# Patient Record
Sex: MALE | Race: WHITE | Employment: FULL TIME | URBAN - METROPOLITAN AREA
[De-identification: names, ages, dates, MRNs, and addresses within clinical notes are randomized per-mention and may not be internally consistent; named-entity substitution may affect disease eponyms.]

---

## 2018-03-22 PROBLEM — F90.2 ATTENTION DEFICIT HYPERACTIVITY DISORDER (ADHD), COMBINED TYPE: Status: ACTIVE | Noted: 2018-03-22

## 2019-06-19 NOTE — PROGRESS NOTES
Juan Antonio East is a 30year old male.   Patient presents with:  Establish Care: refill       HPI:   Pt comes as a new pt -- used ot see dr Miriam Thakur here next door   C/c adhd   C/o noted bp is high and states he had a rough day , needs refills       Work (ADHD), combined type  -     Lisdexamfetamine Dimesylate (VYVANSE) 50 MG Oral Cap; Take 1 capsule (50 mg total) by mouth every morning.     labs from March 2018 reviewed  Will give panel for meds at next visit   He is acquired and he might get a different i

## 2019-06-19 NOTE — PATIENT INSTRUCTIONS
Attention-Deficit/Hyperactivity Disorder (ADHD) in Adults  You’ve always had trouble concentrating. Your mind wanders, and it’s hard to finish tasks. As a result, you didn’t do well in school. And now, you often struggle with your job.  Sometimes this pippa The first step is finding out if you really have ADHD. Your doctor will use special guidelines to diagnose the disorder. Most adults with ADHD are greatly helped by therapy and coaching.  In some cases, your doctor may also prescribe medicine to ease your s

## 2019-07-22 ENCOUNTER — OFFICE VISIT (OUTPATIENT)
Dept: INTERNAL MEDICINE CLINIC | Facility: CLINIC | Age: 29
End: 2019-07-22
Payer: COMMERCIAL

## 2019-07-22 VITALS
HEIGHT: 75 IN | DIASTOLIC BLOOD PRESSURE: 91 MMHG | SYSTOLIC BLOOD PRESSURE: 142 MMHG | HEART RATE: 74 BPM | WEIGHT: 254.19 LBS | BODY MASS INDEX: 31.61 KG/M2

## 2019-07-22 DIAGNOSIS — F90.2 ATTENTION DEFICIT HYPERACTIVITY DISORDER (ADHD), COMBINED TYPE: ICD-10-CM

## 2019-07-22 DIAGNOSIS — I10 ESSENTIAL HYPERTENSION: Primary | ICD-10-CM

## 2019-07-22 PROCEDURE — 99214 OFFICE O/P EST MOD 30 MIN: CPT | Performed by: INTERNAL MEDICINE

## 2019-07-22 NOTE — PROGRESS NOTES
Mary Kay Carrillo is a 30year old male.   Patient presents with:  ADHD      HPI:   Pt comes for f/u  C/c adhd   C/o noted bp is high   Started intermittent fasting and mor eksantos with more protein and fats   Stress at work- might lose his job   Doesn't think it shortness of breath, cough, wheezing  CARDIOVASCULAR: denies chest pain on exertion, palpitations, swelling in feet  GI: denies abdominal pain and denies heartburn, nausea or vomiting  NEURO: denies headaches , anxiety, depression-- denies this but just st

## 2019-11-08 ENCOUNTER — MED REC SCAN ONLY (OUTPATIENT)
Dept: INTERNAL MEDICINE CLINIC | Facility: CLINIC | Age: 29
End: 2019-11-08

## 2019-11-08 NOTE — PROGRESS NOTES
Patient ID: Olena Odell is a 30year old male. Patient presents with:  Medication Request: Vyvanse        HISTORY OF PRESENT ILLNESS:   HPI  Patient presents for above. Here for refill on his Vyvanse. He is on a stable dose.   Patient states he will b Highest education level: Not on file    Occupational History      Not on file    Social Needs      Financial resource strain: Not on file      Food insecurity:        Worry: Not on file        Inability: Not on file      Transportation needs:        Lanette Croft disorder (ADHD), combined type  · Lisdexamfetamine Dimesylate (VYVANSE) 50 MG Oral Cap; Take 1 capsule (50 mg total) by mouth daily. Dispense: 30 capsule; Refill: 0  · Lisdexamfetamine Dimesylate (VYVANSE) 50 MG Oral Cap;  Take 1 capsule (50 mg total) by m

## 2019-11-13 ENCOUNTER — PATIENT MESSAGE (OUTPATIENT)
Dept: INTERNAL MEDICINE CLINIC | Facility: CLINIC | Age: 29
End: 2019-11-13

## 2019-11-13 NOTE — TELEPHONE ENCOUNTER
From: Flip Veras  To: Aida Rodriguez MD  Sent: 11/13/2019 12:05 PM CST  Subject: Prescription Question    Hello,  I was wondering if it would be possible to start getting a prescription for an ADHD medication with a generic option.  I just found out I am

## 2019-11-21 NOTE — TELEPHONE ENCOUNTER
Called pt and spoke to pt --his insurance is going to change and Vyvanse will be too expensive so wondering if he can go back to Adderall which was he was on a long time ago  He was changed from Adderall to Vyvanse to see if it would work better  Will forw

## 2019-11-29 RX ORDER — DEXTROAMPHETAMINE SACCHARATE, AMPHETAMINE ASPARTATE, DEXTROAMPHETAMINE SULFATE AND AMPHETAMINE SULFATE 1.25; 1.25; 1.25; 1.25 MG/1; MG/1; MG/1; MG/1
5 TABLET ORAL DAILY
Qty: 90 TABLET | Refills: 0 | Status: SHIPPED | OUTPATIENT
Start: 2019-11-29 | End: 2020-01-13

## 2019-11-30 NOTE — TELEPHONE ENCOUNTER
Please let patient know that Adderall has been sent to his pharmacy on file–low-dose and can increase as needed. He can start this after he finishes the Vyvanse. Please ask him not to take both at the same time.

## 2020-01-14 RX ORDER — DEXTROAMPHETAMINE SACCHARATE, AMPHETAMINE ASPARTATE, DEXTROAMPHETAMINE SULFATE AND AMPHETAMINE SULFATE 1.25; 1.25; 1.25; 1.25 MG/1; MG/1; MG/1; MG/1
5 TABLET ORAL DAILY
Qty: 30 TABLET | Refills: 0 | Status: SHIPPED | OUTPATIENT
Start: 2020-02-13 | End: 2020-03-04

## 2020-01-14 RX ORDER — DEXTROAMPHETAMINE SACCHARATE, AMPHETAMINE ASPARTATE, DEXTROAMPHETAMINE SULFATE AND AMPHETAMINE SULFATE 1.25; 1.25; 1.25; 1.25 MG/1; MG/1; MG/1; MG/1
5 TABLET ORAL DAILY
Qty: 30 TABLET | Refills: 0 | Status: SHIPPED | OUTPATIENT
Start: 2020-03-13 | End: 2020-03-04

## 2020-01-14 RX ORDER — DEXTROAMPHETAMINE SACCHARATE, AMPHETAMINE ASPARTATE, DEXTROAMPHETAMINE SULFATE AND AMPHETAMINE SULFATE 1.25; 1.25; 1.25; 1.25 MG/1; MG/1; MG/1; MG/1
5 TABLET ORAL DAILY
Qty: 30 TABLET | Refills: 0 | Status: SHIPPED | OUTPATIENT
Start: 2020-01-14 | End: 2020-03-04

## 2020-01-14 NOTE — TELEPHONE ENCOUNTER
Controlled medication pending for review. Please change to phone in, fax, or print script if not being sent electronically.     Last Rx: 11-29-19 # 90  LOV: 7-22-19      Requested Prescriptions     Pending Prescriptions Disp Refills   • amphetamine-dex

## 2020-03-04 NOTE — PROGRESS NOTES
Rome Khoury is a 30year old male.   Patient presents with:  HTN      HPI:   Pt comes for f/u  C/c htn  C/o htn --stopped the thermogenic pills  But bp still high even at home   Never on meds   Lost his insurace and had to chane from [facility] to adderal -- cough, wheezing  CARDIOVASCULAR: denies chest pain on exertion, palpitations, swelling in feet  NEURO: denies headaches , ?  Anxiety,no  Depression-- much better now , was worse during the holidays like nov and dec when he broke wup with his GF who was foun issues and agrees to the plan. No follow-ups on file.

## 2020-03-04 NOTE — PATIENT INSTRUCTIONS
Eating for your heart doesn’t have to be hard or boring. You just need to know how to make healthier choices. The DASH eating plan has been developed to help you do just that. DASH stands for Dietary Approaches to Stop Hypertension.  It is a plan that has b visible fat. Broil, grill, roast, or boil instead of frying. Remove skin from poultry before eating.  Limit how much red meat you eat.  Nuts, seeds, beans  Servings: 4 to 5 a week  A serving is:  · One-third cup nuts (one and a half ounces)  · 2 tablespoons salt  Stay away from:  · Sausage, reyes, and ham  · Flour tortillas  · Packaged muffins, pancakes, and biscuits  · Instant hot cereals  · Cottage cheese  For lunch and dinner  · Fresh fish, chicken, turkey, or meat—baked, broiled, or roasted without salt

## 2020-05-11 RX ORDER — DEXTROAMPHETAMINE SACCHARATE, AMPHETAMINE ASPARTATE, DEXTROAMPHETAMINE SULFATE AND AMPHETAMINE SULFATE 5; 5; 5; 5 MG/1; MG/1; MG/1; MG/1
20 TABLET ORAL DAILY
Qty: 30 TABLET | Refills: 0 | Status: SHIPPED | OUTPATIENT
Start: 2020-05-11 | End: 2020-06-15

## 2020-06-09 ENCOUNTER — NURSE TRIAGE (OUTPATIENT)
Dept: INTERNAL MEDICINE CLINIC | Facility: CLINIC | Age: 30
End: 2020-06-09

## 2020-06-09 NOTE — TELEPHONE ENCOUNTER
Action Requested: Summary for Provider     []  Critical Lab, Recommendations Needed  [] Need Additional Advice  [x]   FYI    []   Need Orders  [] Need Medications Sent to Pharmacy  []  Other     SUMMARY:     Spoke with pt,  verified, pt c/o rash on bila

## 2020-06-09 NOTE — TELEPHONE ENCOUNTER
Sent a [employer] message for the patient to call us.    ----- Message from Davin Ferreira sent at 6/9/2020 10:57 AM CDT -----  Regarding: Prescription Question  Contact: [phone]  Hello,  For the last 8+ months I've been very itchy and had red bumps/pimpl

## 2020-06-10 ENCOUNTER — VIRTUAL PHONE E/M (OUTPATIENT)
Dept: INTERNAL MEDICINE CLINIC | Facility: CLINIC | Age: 30
End: 2020-06-10

## 2020-06-10 DIAGNOSIS — R21 RASH: Primary | ICD-10-CM

## 2020-06-10 PROCEDURE — 99442 PHONE E/M BY PHYS 11-20 MIN: CPT | Performed by: INTERNAL MEDICINE

## 2020-06-10 RX ORDER — PERMETHRIN 50 MG/G
1 CREAM TOPICAL ONCE
Qty: 60 G | Refills: 2 | Status: SHIPPED | OUTPATIENT
Start: 2020-06-10 | End: 2020-06-10

## 2020-06-10 NOTE — PATIENT INSTRUCTIONS
Scabies   Scabies is a skin infection. It's caused by a tiny parasitic mite that's too small to see directly. It can be seen under a microscope, but it's usually recognized only by the rash and symptoms it causes.  This can make it hard to diagnose since · Use the cream on your body when your skin is cool and dry. Don’t use it after a hot shower or bath. · Follow your healthcare provider's instructions for applying the medicine. Usually the cream is put on your whole body.  This means from your chin all th Follow-up care  Follow up with your healthcare provider, or as advised. Call your provider if your symptoms don’t improve after 1 week, or if new burrows or rashes appear.   When to seek medical advice  Call your healthcare provider right away if any of the

## 2020-06-10 NOTE — PROGRESS NOTES
Telemedicine Visit     Virtual/Telephone Check-In    Jorge A Sanchez verbally consents to a Telephone Check-In service on 06/10/20.  Patient understands and accepts financial responsibility for any deductible, co-insurance and/or co-pays associated with this respiratory: Yes []     No [x]     • Diabetes: Yes []     No [x]      • Heart disease: Yes []     No [x]      • Travel: Yes []     No [x]      • Sick contacts: Yes []     No [x]       • Contact with COVID19:     PUI  []     Confirmed []     No contact with limitations of this visit as no physical exam could be performed. Every conscious effort was taken to allow for sufficient and adequate time. This billing was spent on reviewing labs, medications, radiology tests and decision making.   Appropriate medical

## 2020-06-15 RX ORDER — DEXTROAMPHETAMINE SACCHARATE, AMPHETAMINE ASPARTATE, DEXTROAMPHETAMINE SULFATE AND AMPHETAMINE SULFATE 5; 5; 5; 5 MG/1; MG/1; MG/1; MG/1
20 TABLET ORAL DAILY
Qty: 30 TABLET | Refills: 0 | Status: SHIPPED | OUTPATIENT
Start: 2020-06-15 | End: 2020-08-03

## 2020-08-03 DIAGNOSIS — I10 ESSENTIAL HYPERTENSION: ICD-10-CM

## 2020-08-03 RX ORDER — AMLODIPINE BESYLATE 5 MG/1
5 TABLET ORAL DAILY
Qty: 90 TABLET | Refills: 3 | Status: SHIPPED | OUTPATIENT
Start: 2020-08-03 | End: 2020-10-13

## 2020-08-03 RX ORDER — DEXTROAMPHETAMINE SACCHARATE, AMPHETAMINE ASPARTATE, DEXTROAMPHETAMINE SULFATE AND AMPHETAMINE SULFATE 5; 5; 5; 5 MG/1; MG/1; MG/1; MG/1
20 TABLET ORAL DAILY
Qty: 30 TABLET | Refills: 0 | Status: SHIPPED | OUTPATIENT
Start: 2020-08-03 | End: 2020-08-03

## 2020-08-03 RX ORDER — DEXTROAMPHETAMINE SACCHARATE, AMPHETAMINE ASPARTATE, DEXTROAMPHETAMINE SULFATE AND AMPHETAMINE SULFATE 5; 5; 5; 5 MG/1; MG/1; MG/1; MG/1
20 TABLET ORAL DAILY
Qty: 30 TABLET | Refills: 0 | Status: SHIPPED | OUTPATIENT
Start: 2020-08-03 | End: 2020-09-14

## 2020-08-03 RX ORDER — AMLODIPINE BESYLATE 5 MG/1
5 TABLET ORAL DAILY
Qty: 90 TABLET | Refills: 3 | Status: SHIPPED | OUTPATIENT
Start: 2020-08-03 | End: 2020-08-03

## 2020-08-04 ENCOUNTER — PATIENT MESSAGE (OUTPATIENT)
Dept: INTERNAL MEDICINE CLINIC | Facility: CLINIC | Age: 30
End: 2020-08-04

## 2020-08-04 NOTE — TELEPHONE ENCOUNTER
From: David Mems  To: Efra Norton MD  Sent: 8/4/2020 11:52 AM CDT  Subject: Prescription Question    I got a 2 messages about my perscription renewal. One said it is approved and the other said denied.  Just wondering which it is and why it was denied no

## 2020-09-15 RX ORDER — DEXTROAMPHETAMINE SACCHARATE, AMPHETAMINE ASPARTATE, DEXTROAMPHETAMINE SULFATE AND AMPHETAMINE SULFATE 5; 5; 5; 5 MG/1; MG/1; MG/1; MG/1
20 TABLET ORAL DAILY
Qty: 30 TABLET | Refills: 0 | Status: SHIPPED | OUTPATIENT
Start: 2020-09-15 | End: 2020-10-13

## 2020-09-15 RX ORDER — DEXTROAMPHETAMINE SACCHARATE, AMPHETAMINE ASPARTATE, DEXTROAMPHETAMINE SULFATE AND AMPHETAMINE SULFATE 5; 5; 5; 5 MG/1; MG/1; MG/1; MG/1
20 TABLET ORAL DAILY
Qty: 30 TABLET | Refills: 0 | OUTPATIENT
Start: 2020-09-15

## 2020-10-13 DIAGNOSIS — I10 ESSENTIAL HYPERTENSION: ICD-10-CM

## 2020-10-14 RX ORDER — DEXTROAMPHETAMINE SACCHARATE, AMPHETAMINE ASPARTATE, DEXTROAMPHETAMINE SULFATE AND AMPHETAMINE SULFATE 5; 5; 5; 5 MG/1; MG/1; MG/1; MG/1
20 TABLET ORAL DAILY
Qty: 30 TABLET | Refills: 0 | Status: SHIPPED | OUTPATIENT
Start: 2020-10-14 | End: 2020-12-30

## 2020-10-14 RX ORDER — AMLODIPINE BESYLATE 5 MG/1
5 TABLET ORAL DAILY
Qty: 90 TABLET | Refills: 3 | Status: SHIPPED | OUTPATIENT
Start: 2020-10-14 | End: 2020-12-30

## 2020-12-30 DIAGNOSIS — I10 ESSENTIAL HYPERTENSION: ICD-10-CM

## 2020-12-30 RX ORDER — AMLODIPINE BESYLATE 5 MG/1
5 TABLET ORAL DAILY
Qty: 90 TABLET | Refills: 3 | Status: SHIPPED | OUTPATIENT
Start: 2020-12-30 | End: 2021-01-27

## 2020-12-30 RX ORDER — DEXTROAMPHETAMINE SACCHARATE, AMPHETAMINE ASPARTATE, DEXTROAMPHETAMINE SULFATE AND AMPHETAMINE SULFATE 5; 5; 5; 5 MG/1; MG/1; MG/1; MG/1
20 TABLET ORAL DAILY
Qty: 30 TABLET | Refills: 0 | Status: SHIPPED | OUTPATIENT
Start: 2020-12-30 | End: 2021-01-27

## 2021-01-27 DIAGNOSIS — I10 ESSENTIAL HYPERTENSION: ICD-10-CM

## 2021-01-27 RX ORDER — AMLODIPINE BESYLATE 5 MG/1
5 TABLET ORAL DAILY
Qty: 90 TABLET | Refills: 3 | Status: SHIPPED | OUTPATIENT
Start: 2021-01-27 | End: 2021-03-09

## 2021-01-27 RX ORDER — DEXTROAMPHETAMINE SACCHARATE, AMPHETAMINE ASPARTATE, DEXTROAMPHETAMINE SULFATE AND AMPHETAMINE SULFATE 5; 5; 5; 5 MG/1; MG/1; MG/1; MG/1
20 TABLET ORAL DAILY
Qty: 30 TABLET | Refills: 0 | Status: SHIPPED | OUTPATIENT
Start: 2021-01-27 | End: 2021-03-09

## 2021-03-09 DIAGNOSIS — I10 ESSENTIAL HYPERTENSION: ICD-10-CM

## 2021-03-10 RX ORDER — AMLODIPINE BESYLATE 5 MG/1
5 TABLET ORAL DAILY
Qty: 90 TABLET | Refills: 3 | Status: SHIPPED | OUTPATIENT
Start: 2021-03-10 | End: 2021-04-07

## 2021-03-10 RX ORDER — DEXTROAMPHETAMINE SACCHARATE, AMPHETAMINE ASPARTATE, DEXTROAMPHETAMINE SULFATE AND AMPHETAMINE SULFATE 5; 5; 5; 5 MG/1; MG/1; MG/1; MG/1
20 TABLET ORAL DAILY
Qty: 30 TABLET | Refills: 0 | Status: SHIPPED | OUTPATIENT
Start: 2021-03-10 | End: 2021-04-07

## 2021-04-07 DIAGNOSIS — I10 ESSENTIAL HYPERTENSION: ICD-10-CM

## 2021-04-07 RX ORDER — DEXTROAMPHETAMINE SACCHARATE, AMPHETAMINE ASPARTATE, DEXTROAMPHETAMINE SULFATE AND AMPHETAMINE SULFATE 5; 5; 5; 5 MG/1; MG/1; MG/1; MG/1
20 TABLET ORAL DAILY
Qty: 30 TABLET | Refills: 0 | Status: SHIPPED | OUTPATIENT
Start: 2021-04-07 | End: 2021-05-18

## 2021-04-07 RX ORDER — AMLODIPINE BESYLATE 5 MG/1
5 TABLET ORAL DAILY
Qty: 90 TABLET | Refills: 3 | Status: SHIPPED | OUTPATIENT
Start: 2021-04-07 | End: 2021-10-14

## 2021-04-16 ENCOUNTER — IMMUNIZATION (OUTPATIENT)
Dept: LAB | Facility: HOSPITAL | Age: 31
End: 2021-04-16
Attending: EMERGENCY MEDICINE
Payer: OTHER GOVERNMENT

## 2021-04-16 DIAGNOSIS — Z23 NEED FOR VACCINATION: Primary | ICD-10-CM

## 2021-04-16 PROCEDURE — 0011A SARSCOV2 VAC 100MCG/0.5ML IM: CPT

## 2021-05-14 ENCOUNTER — IMMUNIZATION (OUTPATIENT)
Dept: LAB | Facility: HOSPITAL | Age: 31
End: 2021-05-14
Attending: EMERGENCY MEDICINE
Payer: COMMERCIAL

## 2021-05-14 DIAGNOSIS — Z23 NEED FOR VACCINATION: Primary | ICD-10-CM

## 2021-05-14 PROCEDURE — 0012A SARSCOV2 VAC 100MCG/0.5ML IM: CPT

## 2021-05-18 RX ORDER — DEXTROAMPHETAMINE SACCHARATE, AMPHETAMINE ASPARTATE, DEXTROAMPHETAMINE SULFATE AND AMPHETAMINE SULFATE 5; 5; 5; 5 MG/1; MG/1; MG/1; MG/1
20 TABLET ORAL DAILY
Qty: 30 TABLET | Refills: 0 | Status: SHIPPED | OUTPATIENT
Start: 2021-05-18 | End: 2021-06-14

## 2021-06-14 RX ORDER — DEXTROAMPHETAMINE SACCHARATE, AMPHETAMINE ASPARTATE, DEXTROAMPHETAMINE SULFATE AND AMPHETAMINE SULFATE 5; 5; 5; 5 MG/1; MG/1; MG/1; MG/1
20 TABLET ORAL DAILY
Qty: 30 TABLET | Refills: 0 | Status: SHIPPED | OUTPATIENT
Start: 2021-06-14 | End: 2021-07-06

## 2021-06-14 NOTE — TELEPHONE ENCOUNTER
Last office visit /telemedicine 6/10/20. CSS=please call and assist for a follow up appointment. No future appointments.

## 2021-07-08 RX ORDER — DEXTROAMPHETAMINE SACCHARATE, AMPHETAMINE ASPARTATE, DEXTROAMPHETAMINE SULFATE AND AMPHETAMINE SULFATE 5; 5; 5; 5 MG/1; MG/1; MG/1; MG/1
20 TABLET ORAL DAILY
Qty: 30 TABLET | Refills: 0 | Status: SHIPPED | OUTPATIENT
Start: 2021-07-08 | End: 2021-08-03

## 2021-07-08 NOTE — TELEPHONE ENCOUNTER
Refilled , but patient is due for a follow-up appointment as he has not been seen for a year and is getting refills on a controlled substance

## 2021-08-03 RX ORDER — DEXTROAMPHETAMINE SACCHARATE, AMPHETAMINE ASPARTATE, DEXTROAMPHETAMINE SULFATE AND AMPHETAMINE SULFATE 5; 5; 5; 5 MG/1; MG/1; MG/1; MG/1
20 TABLET ORAL DAILY
Qty: 30 TABLET | Refills: 0 | Status: SHIPPED | OUTPATIENT
Start: 2021-08-03 | End: 2021-10-12

## 2021-08-03 NOTE — TELEPHONE ENCOUNTER
Refilled but pt will need an apt since it has been well over one yr -- he can f/u with any avail provider who is under his insurance network - noted he was having some insurance network

## 2021-08-03 NOTE — TELEPHONE ENCOUNTER
Protocol failed or has No Protocol, please review  Requested Prescriptions   Pending Prescriptions Disp Refills    amphetamine-dextroamphetamine (ADDERALL) 20 MG Oral Tab 30 tablet 0     Sig: Take 1 tablet (20 mg total) by mouth daily.         There is no r

## 2021-09-21 RX ORDER — DEXTROAMPHETAMINE SACCHARATE, AMPHETAMINE ASPARTATE, DEXTROAMPHETAMINE SULFATE AND AMPHETAMINE SULFATE 5; 5; 5; 5 MG/1; MG/1; MG/1; MG/1
20 TABLET ORAL DAILY
Qty: 30 TABLET | Refills: 0 | OUTPATIENT
Start: 2021-09-21

## 2021-09-21 NOTE — TELEPHONE ENCOUNTER
No future appointments. Requested Prescriptions     Refused Prescriptions Disp Refills   • amphetamine-dextroamphetamine (ADDERALL) 20 MG Oral Tab 30 tablet 0     Sig: Take 1 tablet (20 mg total) by mouth daily.      Refused By: Audrey Jacobo

## 2021-09-21 NOTE — TELEPHONE ENCOUNTER
Please review. Protocol Failed / No Protocol. Requested Prescriptions   Pending Prescriptions Disp Refills    amphetamine-dextroamphetamine (ADDERALL) 20 MG Oral Tab 30 tablet 0     Sig: Take 1 tablet (20 mg total) by mouth daily.         There is no re

## 2021-10-12 PROBLEM — I10 PRIMARY HYPERTENSION: Status: ACTIVE | Noted: 2021-10-12

## 2021-10-12 NOTE — PROGRESS NOTES
Patient ID: Yaz Soliz is a 28year old male. Patient presents with: Follow - Up         HISTORY OF PRESENT ILLNESS:   Patient presents for above. This visit is conducted using Telemedicine with live, interactive video and audio.     C/c   C/o wo Sexual activity: Yes    Other Topics      Concerns:        Not on file    Social History Narrative      Not on file    Social Determinants of Health  Financial Resource Strain:       Difficulty of Paying Living Expenses: Not on file  Food Insecurity:       behavioral health for possible evaluation to see if the medication needs to be increased    2.  Primary hypertension  Advised pt to follow a low salt , low sodium (including fast foods and processed foods), can look up DASH diet, exercise 30 min a day , mon tests and decision making. Appropriate medical decision-making and tests are ordered as detailed in the plan of care above. Coding/billing information is submitted for this visit based on complexity of care and/or time spent for the visit.     This note wa

## 2021-10-14 DIAGNOSIS — F90.2 ATTENTION DEFICIT HYPERACTIVITY DISORDER (ADHD), COMBINED TYPE: ICD-10-CM

## 2021-10-14 DIAGNOSIS — I10 ESSENTIAL HYPERTENSION: ICD-10-CM

## 2021-10-14 RX ORDER — AMLODIPINE BESYLATE 5 MG/1
5 TABLET ORAL DAILY
Qty: 90 TABLET | Refills: 3 | Status: SHIPPED | OUTPATIENT
Start: 2021-10-14 | End: 2021-11-29

## 2021-10-14 RX ORDER — DEXTROAMPHETAMINE SACCHARATE, AMPHETAMINE ASPARTATE, DEXTROAMPHETAMINE SULFATE AND AMPHETAMINE SULFATE 5; 5; 5; 5 MG/1; MG/1; MG/1; MG/1
20 TABLET ORAL DAILY
Qty: 30 TABLET | Refills: 0 | Status: SHIPPED | OUTPATIENT
Start: 2021-10-14 | End: 2021-11-29

## 2021-10-16 ENCOUNTER — MED REC SCAN ONLY (OUTPATIENT)
Dept: INTERNAL MEDICINE CLINIC | Facility: CLINIC | Age: 31
End: 2021-10-16

## 2021-11-29 DIAGNOSIS — F90.2 ATTENTION DEFICIT HYPERACTIVITY DISORDER (ADHD), COMBINED TYPE: ICD-10-CM

## 2021-11-29 DIAGNOSIS — I10 ESSENTIAL HYPERTENSION: ICD-10-CM

## 2021-11-29 RX ORDER — DEXTROAMPHETAMINE SACCHARATE, AMPHETAMINE ASPARTATE, DEXTROAMPHETAMINE SULFATE AND AMPHETAMINE SULFATE 5; 5; 5; 5 MG/1; MG/1; MG/1; MG/1
20 TABLET ORAL DAILY
Qty: 30 TABLET | Refills: 0 | Status: SHIPPED | OUTPATIENT
Start: 2021-11-29 | End: 2022-01-05

## 2021-11-29 RX ORDER — AMLODIPINE BESYLATE 5 MG/1
5 TABLET ORAL DAILY
Qty: 90 TABLET | Refills: 3 | Status: SHIPPED | OUTPATIENT
Start: 2021-11-29 | End: 2022-11-24

## 2022-01-05 DIAGNOSIS — F90.2 ATTENTION DEFICIT HYPERACTIVITY DISORDER (ADHD), COMBINED TYPE: ICD-10-CM

## 2022-01-05 DIAGNOSIS — I10 ESSENTIAL HYPERTENSION: ICD-10-CM

## 2022-01-05 RX ORDER — DEXTROAMPHETAMINE SACCHARATE, AMPHETAMINE ASPARTATE, DEXTROAMPHETAMINE SULFATE AND AMPHETAMINE SULFATE 5; 5; 5; 5 MG/1; MG/1; MG/1; MG/1
20 TABLET ORAL DAILY
Qty: 30 TABLET | Refills: 0 | Status: SHIPPED | OUTPATIENT
Start: 2022-01-05

## 2022-01-05 RX ORDER — AMLODIPINE BESYLATE 5 MG/1
5 TABLET ORAL DAILY
Qty: 90 TABLET | Refills: 3 | OUTPATIENT
Start: 2022-01-05 | End: 2022-12-31

## 2022-01-06 NOTE — TELEPHONE ENCOUNTER
Pt should have refills available for amlodipine.     Refill request for Adderall forwarded to Dr Paige Branch for review

## 2022-02-23 RX ORDER — AMLODIPINE BESYLATE 5 MG/1
5 TABLET ORAL DAILY
Qty: 90 TABLET | Refills: 3 | OUTPATIENT
Start: 2022-02-23 | End: 2023-02-18

## 2022-02-23 RX ORDER — DEXTROAMPHETAMINE SACCHARATE, AMPHETAMINE ASPARTATE, DEXTROAMPHETAMINE SULFATE AND AMPHETAMINE SULFATE 5; 5; 5; 5 MG/1; MG/1; MG/1; MG/1
20 TABLET ORAL DAILY
Qty: 30 TABLET | Refills: 0 | Status: SHIPPED | OUTPATIENT
Start: 2022-02-23 | End: 2022-03-31

## 2022-04-01 RX ORDER — DEXTROAMPHETAMINE SACCHARATE, AMPHETAMINE ASPARTATE, DEXTROAMPHETAMINE SULFATE AND AMPHETAMINE SULFATE 5; 5; 5; 5 MG/1; MG/1; MG/1; MG/1
20 TABLET ORAL DAILY
Qty: 30 TABLET | Refills: 0 | Status: SHIPPED | OUTPATIENT
Start: 2022-04-01

## 2022-04-01 RX ORDER — AMLODIPINE BESYLATE 5 MG/1
5 TABLET ORAL DAILY
Qty: 90 TABLET | Refills: 1 | Status: SHIPPED | OUTPATIENT
Start: 2022-04-01 | End: 2023-03-27

## 2022-04-01 NOTE — TELEPHONE ENCOUNTER
Please review; protocol failed. Requested Prescriptions   Pending Prescriptions Disp Refills    amLODIPine 5 MG Oral Tab 90 tablet 3     Sig: Take 1 tablet (5 mg total) by mouth daily. Hypertensive Medications Protocol Failed - 3/31/2022 12:18 PM        Failed - CMP or BMP in past 12 months        Failed - Appointment in past 6 or next 3 months        Failed - GFR Non- > 50     No results found for: GFRNAA                 amphetamine-dextroamphetamine (ADDERALL) 20 MG Oral Tab 30 tablet 0     Sig: Take 1 tablet (20 mg total) by mouth daily.         There is no refill protocol information for this order               Recent Outpatient Visits              5 months ago Attention deficit hyperactivity disorder (ADHD), combined type    Steven Ariza MD    Telemedicine    1 year ago [facility], [address] [place] [address], Steven Dangelo MD    [employer] [facility]    2 years ago Attention deficit hyperactivity disorder (ADHD), combined type    Steven Ariza MD    Office Visit    2 years ago Attention deficit hyperactivity disorder (ADHD), combined type    Shaneka Ariza Eugene Fire, MD    Office Visit    2 years ago Essential hypertension    Steven Ariza MD    Office Visit

## 2022-04-01 NOTE — TELEPHONE ENCOUNTER
Please review; protocol failed. Requested Prescriptions   Pending Prescriptions Disp Refills    amLODIPine 5 MG Oral Tab 90 tablet 1     Sig: Take 1 tablet (5 mg total) by mouth daily. Hypertensive Medications Protocol Failed - 3/31/2022 12:18 PM        Failed - CMP or BMP in past 12 months        Failed - Appointment in past 6 or next 3 months        Failed - GFR Non- > 50     No results found for: GFRNAA                 amphetamine-dextroamphetamine (ADDERALL) 20 MG Oral Tab 30 tablet 0     Sig: Take 1 tablet (20 mg total) by mouth daily.         There is no refill protocol information for this order               Recent Outpatient Visits              5 months ago Attention deficit hyperactivity disorder (ADHD), combined type    Vasyl Carbajal MD    Telemedicine    1 year ago [address] [facility], [address] [place], [place] [address]Vasyl MD    [employer] [facility]    2 years ago Attention deficit hyperactivity disorder (ADHD), combined type    Vasyl Carbajal MD    Office Visit    2 years ago Attention deficit hyperactivity disorder (ADHD), combined type    Shaneka Carbajal [place], MD    Office Visit    2 years ago Essential hypertension    Vasyl Carbajal MD    Office Visit

## 2022-06-07 DIAGNOSIS — I10 ESSENTIAL HYPERTENSION: ICD-10-CM

## 2022-06-07 DIAGNOSIS — F90.2 ATTENTION DEFICIT HYPERACTIVITY DISORDER (ADHD), COMBINED TYPE: ICD-10-CM

## 2022-06-08 RX ORDER — DEXTROAMPHETAMINE SACCHARATE, AMPHETAMINE ASPARTATE, DEXTROAMPHETAMINE SULFATE AND AMPHETAMINE SULFATE 5; 5; 5; 5 MG/1; MG/1; MG/1; MG/1
20 TABLET ORAL DAILY
Qty: 30 TABLET | Refills: 0 | OUTPATIENT
Start: 2022-06-08

## 2022-06-08 RX ORDER — AMLODIPINE BESYLATE 5 MG/1
5 TABLET ORAL DAILY
Qty: 90 TABLET | Refills: 1 | OUTPATIENT
Start: 2022-06-08 | End: 2023-06-03

## 2022-06-17 DIAGNOSIS — F90.2 ATTENTION DEFICIT HYPERACTIVITY DISORDER (ADHD), COMBINED TYPE: ICD-10-CM

## 2022-06-17 RX ORDER — DEXTROAMPHETAMINE SACCHARATE, AMPHETAMINE ASPARTATE, DEXTROAMPHETAMINE SULFATE AND AMPHETAMINE SULFATE 5; 5; 5; 5 MG/1; MG/1; MG/1; MG/1
20 TABLET ORAL DAILY
Qty: 30 TABLET | Refills: 0 | Status: SHIPPED | OUTPATIENT
Start: 2022-06-17

## 2022-07-15 DIAGNOSIS — F90.2 ATTENTION DEFICIT HYPERACTIVITY DISORDER (ADHD), COMBINED TYPE: ICD-10-CM

## 2022-07-15 RX ORDER — DEXTROAMPHETAMINE SACCHARATE, AMPHETAMINE ASPARTATE, DEXTROAMPHETAMINE SULFATE AND AMPHETAMINE SULFATE 5; 5; 5; 5 MG/1; MG/1; MG/1; MG/1
20 TABLET ORAL DAILY
Qty: 30 TABLET | Refills: 0 | Status: SHIPPED | OUTPATIENT
Start: 2022-07-15

## 2022-08-10 DIAGNOSIS — F90.2 ATTENTION DEFICIT HYPERACTIVITY DISORDER (ADHD), COMBINED TYPE: ICD-10-CM

## 2022-08-11 RX ORDER — DEXTROAMPHETAMINE SACCHARATE, AMPHETAMINE ASPARTATE, DEXTROAMPHETAMINE SULFATE AND AMPHETAMINE SULFATE 5; 5; 5; 5 MG/1; MG/1; MG/1; MG/1
20 TABLET ORAL DAILY
Qty: 30 TABLET | Refills: 0 | Status: SHIPPED | OUTPATIENT
Start: 2022-08-11

## 2022-08-31 DIAGNOSIS — I10 ESSENTIAL HYPERTENSION: ICD-10-CM

## 2022-08-31 DIAGNOSIS — F90.2 ATTENTION DEFICIT HYPERACTIVITY DISORDER (ADHD), COMBINED TYPE: ICD-10-CM

## 2022-08-31 RX ORDER — DEXTROAMPHETAMINE SACCHARATE, AMPHETAMINE ASPARTATE, DEXTROAMPHETAMINE SULFATE AND AMPHETAMINE SULFATE 5; 5; 5; 5 MG/1; MG/1; MG/1; MG/1
20 TABLET ORAL DAILY
Qty: 30 TABLET | Refills: 0 | Status: SHIPPED | OUTPATIENT
Start: 2022-08-31

## 2022-08-31 RX ORDER — AMLODIPINE BESYLATE 5 MG/1
5 TABLET ORAL DAILY
Qty: 90 TABLET | Refills: 0 | Status: SHIPPED | OUTPATIENT
Start: 2022-08-31

## 2022-08-31 NOTE — TELEPHONE ENCOUNTER
[employer] message sent. Dr Hudson=pended a  month supply only for adderall     CSS=please call and assist,thanks. Last office visit 10/12/2021. No future appointments. Please review; protocol failed/no protocol. Requested Prescriptions   Pending Prescriptions Disp Refills    amLODIPine 5 MG Oral Tab 90 tablet 1     Sig: Take 1 tablet (5 mg total) by mouth daily. Hypertensive Medications Protocol Failed - 8/31/2022  2:04 PM        Failed - In person appointment in the past 12 or next 3 months       Recent Outpatient Visits              10 months ago Attention deficit hyperactivity disorder (ADHD), combined type    Bard Alex Beckett MD    Telemedicine    2 years ago [address] [facility], [address] [facility] Bard Alex Castillo MD    [employer] [facility]    2 years ago Attention deficit hyperactivity disorder (ADHD), combined type    Bard Alex Beckett MD    Office Visit    2 years ago Attention deficit hyperactivity disorder (ADHD), combined type    Shaneka Beckett Enrigue Sparrow, MD    Office Visit    3 years ago Essential hypertension    [facility], [address] [facility] Bard Alex Castillo MD    Office Visit                 Failed - Last BP reading less than 140/90     BP Readings from Last 1 Encounters:  03/04/20 : (!) 150/97                Failed - CMP or BMP in past 6 months     No results found for this or any previous visit (from the past 4392 hour(s)).               Failed - In person appointment or virtual visit in the past 6 months       Recent Outpatient Visits              10 months ago Attention deficit hyperactivity disorder (ADHD), combined type    Bard Alex Beckett MD    Telemedicine    2 years ago Sharon Feliciano MD    [employer] [facility]    2 years ago Attention deficit hyperactivity disorder (ADHD), combined type    Vasyl Olvera MD    Office Visit    2 years ago Attention deficit hyperactivity disorder (ADHD), combined type    Shaneka Olvera Lugenia Balk, MD    Office Visit    3 years ago Essential hypertension    [facility], [facility], Vasyl LA MD    Office Visit                 Failed - GFR > 50     No results found for: [facility]                 amphetamine-dextroamphetamine (ADDERALL) 20 MG Oral Tab 30 tablet 0     Sig: Take 1 tablet (20 mg total) by mouth daily.         There is no refill protocol information for this order            Recent Outpatient Visits              10 months ago Attention deficit hyperactivity disorder (ADHD), combined type    Vasyl Olvera MD    Telemedicine    2 years ago [address] [facility], Vasyl LA MD    [employer] [facility]    2 years ago Attention deficit hyperactivity disorder (ADHD), combined type    Vasyl Olvera MD    Office Visit    2 years ago Attention deficit hyperactivity disorder (ADHD), combined type    Shaneka Olvera Lugenia Balk, MD    Office Visit    3 years ago Essential hypertension    Vasyl Olvera MD    Office Visit

## 2022-12-01 DIAGNOSIS — F90.2 ATTENTION DEFICIT HYPERACTIVITY DISORDER (ADHD), COMBINED TYPE: ICD-10-CM

## 2022-12-01 DIAGNOSIS — I10 ESSENTIAL HYPERTENSION: ICD-10-CM

## 2022-12-05 RX ORDER — AMLODIPINE BESYLATE 5 MG/1
5 TABLET ORAL DAILY
Qty: 90 TABLET | Refills: 0 | OUTPATIENT
Start: 2022-12-05

## 2022-12-05 RX ORDER — DEXTROAMPHETAMINE SACCHARATE, AMPHETAMINE ASPARTATE, DEXTROAMPHETAMINE SULFATE AND AMPHETAMINE SULFATE 5; 5; 5; 5 MG/1; MG/1; MG/1; MG/1
20 TABLET ORAL DAILY
Qty: 30 TABLET | Refills: 0 | OUTPATIENT
Start: 2022-12-05

## 2023-04-30 DIAGNOSIS — I10 ESSENTIAL HYPERTENSION: ICD-10-CM

## 2023-05-02 RX ORDER — AMLODIPINE BESYLATE 5 MG/1
5 TABLET ORAL DAILY
Qty: 90 TABLET | Refills: 0 | OUTPATIENT
Start: 2023-05-02

## 2023-05-02 NOTE — TELEPHONE ENCOUNTER
My chart message sent to pt to advise overdue for office visit and labs.     Please advise to refill request

## 2023-05-15 ENCOUNTER — TELEMEDICINE (OUTPATIENT)
Dept: INTERNAL MEDICINE CLINIC | Facility: CLINIC | Age: 33
End: 2023-05-15

## 2023-05-15 DIAGNOSIS — I10 ESSENTIAL HYPERTENSION: Primary | ICD-10-CM

## 2023-05-15 DIAGNOSIS — F90.2 ATTENTION DEFICIT HYPERACTIVITY DISORDER (ADHD), COMBINED TYPE: ICD-10-CM

## 2023-05-15 RX ORDER — DEXTROAMPHETAMINE SACCHARATE, AMPHETAMINE ASPARTATE, DEXTROAMPHETAMINE SULFATE AND AMPHETAMINE SULFATE 5; 5; 5; 5 MG/1; MG/1; MG/1; MG/1
20 TABLET ORAL DAILY
Qty: 30 TABLET | Refills: 0 | Status: SHIPPED | OUTPATIENT
Start: 2023-05-15 | End: 2023-05-15

## 2023-05-15 RX ORDER — AMLODIPINE BESYLATE 5 MG/1
5 TABLET ORAL DAILY
Qty: 30 TABLET | Refills: 0 | Status: SHIPPED | OUTPATIENT
Start: 2023-05-15

## 2023-05-15 RX ORDER — DEXTROAMPHETAMINE SACCHARATE, AMPHETAMINE ASPARTATE, DEXTROAMPHETAMINE SULFATE AND AMPHETAMINE SULFATE 5; 5; 5; 5 MG/1; MG/1; MG/1; MG/1
20 TABLET ORAL DAILY
Qty: 30 TABLET | Refills: 0 | Status: SHIPPED | OUTPATIENT
Start: 2023-05-15

## 2023-05-16 ENCOUNTER — TELEPHONE (OUTPATIENT)
Dept: INTERNAL MEDICINE CLINIC | Facility: CLINIC | Age: 33
End: 2023-05-16

## 2023-05-16 NOTE — TELEPHONE ENCOUNTER
Received call from [facility]. Medication does not need a PA. It is to early to refill. Can be filled on 06/11/2023. Notified patient.

## 2023-06-06 ENCOUNTER — LAB ENCOUNTER (OUTPATIENT)
Dept: LAB | Age: 33
End: 2023-06-06
Attending: INTERNAL MEDICINE
Payer: COMMERCIAL

## 2023-06-06 ENCOUNTER — OFFICE VISIT (OUTPATIENT)
Dept: INTERNAL MEDICINE CLINIC | Facility: CLINIC | Age: 33
End: 2023-06-06

## 2023-06-06 VITALS
HEIGHT: 74 IN | HEART RATE: 100 BPM | BODY MASS INDEX: 33.24 KG/M2 | WEIGHT: 259 LBS | DIASTOLIC BLOOD PRESSURE: 90 MMHG | TEMPERATURE: 98 F | SYSTOLIC BLOOD PRESSURE: 132 MMHG

## 2023-06-06 DIAGNOSIS — Z11.3 SCREENING FOR STD (SEXUALLY TRANSMITTED DISEASE): ICD-10-CM

## 2023-06-06 DIAGNOSIS — F90.2 ATTENTION DEFICIT HYPERACTIVITY DISORDER (ADHD), COMBINED TYPE: ICD-10-CM

## 2023-06-06 DIAGNOSIS — I10 ESSENTIAL HYPERTENSION: ICD-10-CM

## 2023-06-06 DIAGNOSIS — Z00.00 ANNUAL PHYSICAL EXAM: Primary | ICD-10-CM

## 2023-06-06 PROCEDURE — 3008F BODY MASS INDEX DOCD: CPT | Performed by: INTERNAL MEDICINE

## 2023-06-06 PROCEDURE — 90471 IMMUNIZATION ADMIN: CPT | Performed by: INTERNAL MEDICINE

## 2023-06-06 PROCEDURE — 3080F DIAST BP >= 90 MM HG: CPT | Performed by: INTERNAL MEDICINE

## 2023-06-06 PROCEDURE — 99395 PREV VISIT EST AGE 18-39: CPT | Performed by: INTERNAL MEDICINE

## 2023-06-06 PROCEDURE — 90715 TDAP VACCINE 7 YRS/> IM: CPT | Performed by: INTERNAL MEDICINE

## 2023-06-06 PROCEDURE — 3075F SYST BP GE 130 - 139MM HG: CPT | Performed by: INTERNAL MEDICINE

## 2023-06-06 RX ORDER — BIOTIN 5 MG
TABLET ORAL
COMMUNITY

## 2023-06-06 RX ORDER — AMLODIPINE BESYLATE 5 MG/1
5 TABLET ORAL DAILY
Qty: 90 TABLET | Refills: 3 | Status: SHIPPED | OUTPATIENT
Start: 2023-06-06

## 2023-06-06 RX ORDER — MULTIVIT-MIN/IRON FUM/FOLIC AC 7.5 MG-4
1 TABLET ORAL DAILY
COMMUNITY

## 2023-06-06 NOTE — ASSESSMENT & PLAN NOTE
Did well on vyvanse in the past but too expensive now  Had concerta when he was younger. adderall doesn't really work well for him, next fill he would like to try Concerta again .

## 2023-06-07 LAB
ABSOLUTE BASOPHILS: 31 CELLS/UL (ref 0–200)
ABSOLUTE EOSINOPHILS: 87 CELLS/UL (ref 15–500)
ABSOLUTE LYMPHOCYTES: 1612 CELLS/UL (ref 850–3900)
ABSOLUTE MONOCYTES: 310 CELLS/UL (ref 200–950)
ABSOLUTE NEUTROPHILS: 4160 CELLS/UL (ref 1500–7800)
ALBUMIN/GLOBULIN RATIO: 1.6 (CALC) (ref 1–2.5)
ALBUMIN: 4.9 G/DL (ref 3.6–5.1)
ALKALINE PHOSPHATASE: 78 U/L (ref 36–130)
ALT: 37 U/L (ref 9–46)
AST: 27 U/L (ref 10–40)
BASOPHILS: 0.5 %
BILIRUBIN, TOTAL: 0.7 MG/DL (ref 0.2–1.2)
BUN/CREATININE RATIO: 6 (CALC) (ref 6–22)
BUN: 6 MG/DL (ref 7–25)
CALCIUM: 10.3 MG/DL (ref 8.6–10.3)
CARBON DIOXIDE: 24 MMOL/L (ref 20–32)
CHLAMYDIA TRACHOMATIS$RNA, TMA: NOT DETECTED
CHLORIDE: 102 MMOL/L (ref 98–110)
CHOL/HDLC RATIO: 3.8 (CALC)
CHOLESTEROL, TOTAL: 248 MG/DL
CREATININE: 1.05 MG/DL (ref 0.6–1.26)
EGFR: 97 ML/MIN/1.73M2
EOSINOPHILS: 1.4 %
GLOBULIN: 3.1 G/DL (CALC) (ref 1.9–3.7)
GLUCOSE: 91 MG/DL (ref 65–99)
HDL CHOLESTEROL: 66 MG/DL
HEMATOCRIT: 53.2 % (ref 38.5–50)
HEMOGLOBIN A1C: 4.8 % OF TOTAL HGB
HEMOGLOBIN: 18.2 G/DL (ref 13.2–17.1)
LDL-CHOLESTEROL: 142 MG/DL (CALC)
LYMPHOCYTES: 26 %
MCH: 31.2 PG (ref 27–33)
MCHC: 34.2 G/DL (ref 32–36)
MCV: 91.1 FL (ref 80–100)
MONOCYTES: 5 %
MPV: 10.5 FL (ref 7.5–12.5)
NEISSERIA GONORRHOEAE$RNA, TMA: NOT DETECTED
NEUTROPHILS: 67.1 %
NON-HDL CHOLESTEROL: 182 MG/DL (CALC)
PLATELET COUNT: 266 THOUSAND/UL (ref 140–400)
POTASSIUM: 4.5 MMOL/L (ref 3.5–5.3)
PROTEIN, TOTAL: 8 G/DL (ref 6.1–8.1)
RDW: 12 % (ref 11–15)
RED BLOOD CELL COUNT: 5.84 MILLION/UL (ref 4.2–5.8)
SODIUM: 142 MMOL/L (ref 135–146)
T4 (THYROXINE), TOTAL: 7.7 MCG/DL (ref 4.9–10.5)
TRIGLYCERIDES: 247 MG/DL
TSH: 1.3 MIU/L (ref 0.4–4.5)
WHITE BLOOD CELL COUNT: 6.2 THOUSAND/UL (ref 3.8–10.8)

## 2023-06-09 DIAGNOSIS — D75.1 POLYCYTHEMIA: Primary | ICD-10-CM

## 2023-06-09 DIAGNOSIS — E78.00 PURE HYPERCHOLESTEROLEMIA: ICD-10-CM

## 2023-06-16 DIAGNOSIS — F90.2 ATTENTION DEFICIT HYPERACTIVITY DISORDER (ADHD), COMBINED TYPE: ICD-10-CM

## 2023-06-16 RX ORDER — DEXTROAMPHETAMINE SACCHARATE, AMPHETAMINE ASPARTATE, DEXTROAMPHETAMINE SULFATE AND AMPHETAMINE SULFATE 5; 5; 5; 5 MG/1; MG/1; MG/1; MG/1
20 TABLET ORAL DAILY
Qty: 30 TABLET | Refills: 0 | Status: CANCELLED | OUTPATIENT
Start: 2023-06-16

## 2023-06-18 RX ORDER — METHYLPHENIDATE HYDROCHLORIDE 36 MG/1
36 TABLET ORAL DAILY
Qty: 30 TABLET | Refills: 0 | Status: SHIPPED | OUTPATIENT
Start: 2023-06-18 | End: 2023-07-18

## 2023-06-18 RX ORDER — METHYLPHENIDATE HYDROCHLORIDE 36 MG/1
36 TABLET ORAL DAILY
Qty: 30 TABLET | Refills: 0 | Status: SHIPPED | OUTPATIENT
Start: 2023-08-19 | End: 2023-09-18

## 2023-06-18 RX ORDER — METHYLPHENIDATE HYDROCHLORIDE 36 MG/1
36 TABLET ORAL DAILY
Qty: 30 TABLET | Refills: 0 | Status: SHIPPED | OUTPATIENT
Start: 2023-07-19 | End: 2023-08-18

## 2023-06-18 NOTE — TELEPHONE ENCOUNTER
Medication pended per patient message. Refill intentionally left blank to prompt provider review. Per progress note by Dr. Heather Soto 06/0623:  \"Attention deficit hyperactivity disorder (ADHD), combined type  Did well on vyvanse in the past but too expensive now  Had concerta when he was younger. adderall doesn't really work well for him, next fill he would like to try Concerta again\"      Dr. Heather Soto, patient is requesting Concerta in place of Adderall, please advise, pended for review based on patient report. Thank you.

## 2023-07-06 DIAGNOSIS — F90.2 ATTENTION DEFICIT HYPERACTIVITY DISORDER (ADHD), COMBINED TYPE: ICD-10-CM

## 2023-07-06 RX ORDER — DEXTROAMPHETAMINE SACCHARATE, AMPHETAMINE ASPARTATE, DEXTROAMPHETAMINE SULFATE AND AMPHETAMINE SULFATE 5; 5; 5; 5 MG/1; MG/1; MG/1; MG/1
20 TABLET ORAL DAILY
Qty: 30 TABLET | Refills: 0 | Status: SHIPPED | OUTPATIENT
Start: 2023-07-06

## 2023-07-06 NOTE — TELEPHONE ENCOUNTER
Please review; no protocol    Patient comment: I want to switch to concerta but they are on backorder and I am out of this medication. Can you please refill this to cover me until they have my new medication please? Please advise. Medication pended for your review and approval.     Requested Prescriptions   Pending Prescriptions Disp Refills    amphetamine-dextroamphetamine (ADDERALL) 20 MG Oral Tab 30 tablet 0     Sig: Take 1 tablet (20 mg total) by mouth daily.        There is no refill protocol information for this order

## 2023-08-11 DIAGNOSIS — F90.2 ATTENTION DEFICIT HYPERACTIVITY DISORDER (ADHD), COMBINED TYPE: ICD-10-CM

## 2023-08-11 RX ORDER — METHYLPHENIDATE HYDROCHLORIDE 36 MG/1
36 TABLET ORAL DAILY
Qty: 30 TABLET | Refills: 0 | Status: CANCELLED | OUTPATIENT
Start: 2023-08-11 | End: 2023-09-10

## 2023-08-12 NOTE — TELEPHONE ENCOUNTER
Review pended refill request as it does not fall under a protocol. Last Rx: 07/2023    Requested Prescriptions   Pending Prescriptions Disp Refills    methylphenidate ER (CONCERTA) 36 MG Oral Tab CR 30 tablet 0     Sig: Take 1 tablet (36 mg total) by mouth daily. There is no refill protocol information for this order       amphetamine-dextroamphetamine (ADDERALL) 20 MG Oral Tab 30 tablet 0     Sig: Take 1 tablet (20 mg total) by mouth daily.        There is no refill protocol information for this order

## 2023-08-14 RX ORDER — DEXTROAMPHETAMINE SACCHARATE, AMPHETAMINE ASPARTATE, DEXTROAMPHETAMINE SULFATE AND AMPHETAMINE SULFATE 5; 5; 5; 5 MG/1; MG/1; MG/1; MG/1
20 TABLET ORAL DAILY
Qty: 30 TABLET | Refills: 0 | Status: SHIPPED | OUTPATIENT
Start: 2023-08-14

## 2023-09-19 ENCOUNTER — PATIENT MESSAGE (OUTPATIENT)
Dept: INTERNAL MEDICINE CLINIC | Facility: CLINIC | Age: 33
End: 2023-09-19

## 2023-10-19 DIAGNOSIS — F90.2 ATTENTION DEFICIT HYPERACTIVITY DISORDER (ADHD), COMBINED TYPE: ICD-10-CM

## 2023-10-20 DIAGNOSIS — I10 ESSENTIAL HYPERTENSION: ICD-10-CM

## 2023-10-20 RX ORDER — DEXTROAMPHETAMINE SACCHARATE, AMPHETAMINE ASPARTATE, DEXTROAMPHETAMINE SULFATE AND AMPHETAMINE SULFATE 5; 5; 5; 5 MG/1; MG/1; MG/1; MG/1
20 TABLET ORAL DAILY
Qty: 30 TABLET | Refills: 0 | Status: SHIPPED | OUTPATIENT
Start: 2023-10-20

## 2023-10-20 RX ORDER — AMLODIPINE BESYLATE 5 MG/1
5 TABLET ORAL DAILY
Qty: 90 TABLET | Refills: 3 | OUTPATIENT
Start: 2023-10-20

## 2023-10-20 NOTE — TELEPHONE ENCOUNTER
Please review. Protocol failed / No protocol. Requested Prescriptions   Pending Prescriptions Disp Refills    amphetamine-dextroamphetamine (ADDERALL) 20 MG Oral Tab 30 tablet 0     Sig: Take 1 tablet (20 mg total) by mouth daily.        There is no refill protocol information for this order              Recent Outpatient Visits              4 months ago Annual physical exam    [address], [facility], MD Angie    Office Visit    5 months ago Essential hypertension    Winnie Garg MD    Telemedicine    2 years ago Attention deficit hyperactivity disorder (ADHD), combined type    Winnie Garg MD    Telemedicine    3 years ago Winnie David MD    [employer] [facility]    3 years ago Attention deficit hyperactivity disorder (ADHD), combined type    Winnie Garg MD    Office Visit

## 2023-11-29 DIAGNOSIS — F90.2 ATTENTION DEFICIT HYPERACTIVITY DISORDER (ADHD), COMBINED TYPE: ICD-10-CM

## 2023-11-29 DIAGNOSIS — I10 ESSENTIAL HYPERTENSION: ICD-10-CM

## 2023-11-30 RX ORDER — DEXTROAMPHETAMINE SACCHARATE, AMPHETAMINE ASPARTATE, DEXTROAMPHETAMINE SULFATE AND AMPHETAMINE SULFATE 5; 5; 5; 5 MG/1; MG/1; MG/1; MG/1
20 TABLET ORAL DAILY
Qty: 30 TABLET | Refills: 0 | Status: SHIPPED | OUTPATIENT
Start: 2023-11-30

## 2023-11-30 RX ORDER — AMLODIPINE BESYLATE 5 MG/1
5 TABLET ORAL DAILY
Qty: 90 TABLET | Refills: 3 | Status: SHIPPED | OUTPATIENT
Start: 2023-11-30

## 2023-11-30 NOTE — TELEPHONE ENCOUNTER
Please review; protocol failed. Requested Prescriptions   Pending Prescriptions Disp Refills    amLODIPine 5 MG Oral Tab 90 tablet 3     Sig: Take 1 tablet (5 mg total) by mouth daily. Hypertensive Medications Protocol Failed - 11/29/2023  1:02 PM        Failed - Last BP reading less than 140/90     BP Readings from Last 1 Encounters:   06/06/23 132/90               Passed - In person appointment in the past 12 or next 3 months     Recent Outpatient Visits              5 months ago Annual physical exam    [address], [address], [address] Anne-Marie Arellano MD    Office Visit    6 months ago Essential hypertension    [address], Paige [place], MD    Telemedicine    2 years ago Attention deficit hyperactivity disorder (ADHD), combined type    [address], Paige [place], MD    Telemedicine    3 years ago [address], [address] [facility], Paige [place], MD    [facility] E/M    3 years ago Attention deficit hyperactivity disorder (ADHD), combined type    [address], Paige [place], MD    Office Visit                      Passed - CMP or BMP in past 6 months     Recent Results (from the past 4392 hour(s))   Comp Metabolic Panel (14)    Collection Time: 06/06/23  2:33 PM   Result Value Ref Range    GLUCOSE 91 65 - 99 mg/dL     Comment:               Fasting reference interval         UREA NITROGEN (BUN) 6 (L) 7 - 25 mg/dL    CREATININE 1.05 0.60 - 1.26 mg/dL    EGFR 97 > OR = 60 mL/min/1.73m2     Comment: The eGFR is based on the CKD-EPI 2021 equation. To calculate   the new eGFR from a previous Creatinine or Cystatin C  result, go to [email or web address] [email or web address]/  [email or web address]      BUN/CREATININE RATIO 6 6 - 22 (calc)    SODIUM 142 135 - 146 mmol/L    POTASSIUM 4.5 3.5 - 5.3 mmol/L    CHLORIDE 102 98 - 110 mmol/L CARBON DIOXIDE 24 20 - 32 mmol/L    CALCIUM 10.3 8.6 - 10.3 mg/dL    PROTEIN, TOTAL 8.0 6.1 - 8.1 g/dL    ALBUMIN 4.9 3.6 - 5.1 g/dL    GLOBULIN 3.1 1.9 - 3.7 g/dL (calc)    ALBUMIN/GLOBULIN RATIO 1.6 1.0 - 2.5 (calc)    BILIRUBIN, TOTAL 0.7 0.2 - 1.2 mg/dL    ALKALINE PHOSPHATASE 78 36 - 130 U/L    AST 27 10 - 40 U/L    ALT 37 9 - 46 U/L     *Note: Due to a large number of results and/or encounters for the requested time period, some results have not been displayed. A complete set of results can be found in Results Review. Passed - In person appointment or virtual visit in the past 6 months     Recent Outpatient Visits              5 months ago Annual physical exam    Estrella Jara [place], MD    Office Visit    6 months ago Essential hypertension    Pardeep Jara [place] In[place] Daub, MD    Telemedicine    2 years ago Attention deficit hyperactivity disorder (ADHD), combined type    Brandon Shelton MD    Telemedicine    3 years ago Pardeep Sharma [place] Inell Daub, MD    [facility] E/M    3 years ago Attention deficit hyperactivity disorder (ADHD), combined type    Brandon Shelton MD    Office Visit                      Passed - EGFRCR or GFRNAA > 50     GFR Evaluation  EGFRCR: 97 , resulted on 6/6/2023            amphetamine-dextroamphetamine (ADDERALL) 20 MG Oral Tab 30 tablet 0     Sig: Take 1 tablet (20 mg total) by mouth daily.        There is no refill protocol information for this order        Recent Outpatient Visits              5 months ago Annual physical exam    Estrella Jara [place], MD    Office Visit    6 months ago Essential hypertension    Dave Jara Stevie Perez MD    Telemedicine    2 years ago Attention deficit hyperactivity disorder (ADHD), combined type    [facility]    3 years ago [place] Beata Roa MD    [facility] E/M    3 years ago Attention deficit hyperactivity disorder (ADHD), combined type    [facility]    [facility] Visit

## 2023-12-27 DIAGNOSIS — F90.2 ATTENTION DEFICIT HYPERACTIVITY DISORDER (ADHD), COMBINED TYPE: ICD-10-CM

## 2023-12-28 RX ORDER — DEXTROAMPHETAMINE SACCHARATE, AMPHETAMINE ASPARTATE, DEXTROAMPHETAMINE SULFATE AND AMPHETAMINE SULFATE 5; 5; 5; 5 MG/1; MG/1; MG/1; MG/1
20 TABLET ORAL DAILY
Qty: 30 TABLET | Refills: 0 | Status: SHIPPED | OUTPATIENT
Start: 2023-12-30

## 2023-12-28 NOTE — TELEPHONE ENCOUNTER
Please review; protocol failed/No Protocol  Requested Prescriptions   Pending Prescriptions Disp Refills    amphetamine-dextroamphetamine (ADDERALL) 20 MG Oral Tab 30 tablet 0     Sig: Take 1 tablet (20 mg total) by mouth daily.        There is no refill protocol information for this order          Recent Outpatient Visits              6 months ago Annual physical exam    Tameka Guo MD    Office Visit    7 months ago Essential hypertension    Morena Kan MD    Telemedicine    2 years ago Attention deficit hyperactivity disorder (ADHD), combined type    Morena Kan MD    Telemedicine    3 years ago Morena Bennett MD    [facility] E/M    3 years ago Attention deficit hyperactivity disorder (ADHD), combined type    Morena Kan MD    Office Visit

## 2024-01-17 DIAGNOSIS — F90.2 ATTENTION DEFICIT HYPERACTIVITY DISORDER (ADHD), COMBINED TYPE: ICD-10-CM

## 2024-01-18 NOTE — TELEPHONE ENCOUNTER
Please review; protocol failed/ has no protocol    Requested Prescriptions   Pending Prescriptions Disp Refills    amphetamine-dextroamphetamine (ADDERALL) 20 MG Oral Tab 30 tablet 0     Sig: Take 1 tablet (20 mg total) by mouth daily.       There is no refill protocol information for this order        Recent Outpatient Visits              7 months ago Annual physical exam    [facility] Duane Farmer MD    Office Visit    8 months ago Essential hypertension    [facility], Loretta Carter MD    Telemedicine    2 years ago Attention deficit hyperactivity disorder (ADHD), combined type    [facility]    3 years ago [facility]    [facility]    3 years ago Attention deficit hyperactivity disorder (ADHD), combined type    [facility]    [facility]

## 2024-01-18 NOTE — TELEPHONE ENCOUNTER
On call MD for Dr Farmer;   Colin I dont prescribe this class of meds even for my own patients; will need to wait for Dr Farmer to refill

## 2024-01-18 NOTE — TELEPHONE ENCOUNTER
Please review; protocol failed/ has no protocol    Requested Prescriptions   Pending Prescriptions Disp Refills    amphetamine-dextroamphetamine (ADDERALL) 20 MG Oral Tab 30 tablet 0     Sig: Take 1 tablet (20 mg total) by mouth daily.       There is no refill protocol information for this order        Recent Outpatient Visits              7 months ago Annual physical exam    [facility] Duane Farmer MD    Office Visit    8 months ago Essential hypertension    [facility], Loretta Carter MD    Telemedicine    2 years ago Attention deficit hyperactivity disorder (ADHD), combined type    [facility]    3 years ago Rash    [facility]    Virtual Phone E/M    3 years ago Attention deficit hyperactivity disorder (ADHD), combined type    [facility]    Office Visit

## 2024-01-21 RX ORDER — DEXTROAMPHETAMINE SACCHARATE, AMPHETAMINE ASPARTATE, DEXTROAMPHETAMINE SULFATE AND AMPHETAMINE SULFATE 5; 5; 5; 5 MG/1; MG/1; MG/1; MG/1
20 TABLET ORAL DAILY
Qty: 30 TABLET | Refills: 0 | Status: SHIPPED | OUTPATIENT
Start: 2024-01-21

## 2024-02-26 DIAGNOSIS — F90.2 ATTENTION DEFICIT HYPERACTIVITY DISORDER (ADHD), COMBINED TYPE: ICD-10-CM

## 2024-02-27 NOTE — TELEPHONE ENCOUNTER
Sent a my chart message to schedule an appointment for further refills on his Adderall as last office visit was 6/6/32    Review pended refill request as it does not fall under a protocol.    Last Rx: 1/21/24    LOV: 6/6/23

## 2024-02-28 RX ORDER — DEXTROAMPHETAMINE SACCHARATE, AMPHETAMINE ASPARTATE, DEXTROAMPHETAMINE SULFATE AND AMPHETAMINE SULFATE 5; 5; 5; 5 MG/1; MG/1; MG/1; MG/1
20 TABLET ORAL DAILY
Qty: 30 TABLET | Refills: 0 | Status: CANCELLED | OUTPATIENT
Start: 2024-02-28

## 2024-02-29 ENCOUNTER — PATIENT MESSAGE (OUTPATIENT)
Dept: INTERNAL MEDICINE CLINIC | Facility: CLINIC | Age: 34
End: 2024-02-29

## 2024-02-29 NOTE — TELEPHONE ENCOUNTER
Message left for patient to call back or return [employer] message sent, whether to try a different pharmacy or  in person.

## 2024-03-01 RX ORDER — DEXTROAMPHETAMINE SACCHARATE, AMPHETAMINE ASPARTATE, DEXTROAMPHETAMINE SULFATE AND AMPHETAMINE SULFATE 5; 5; 5; 5 MG/1; MG/1; MG/1; MG/1
20 TABLET ORAL DAILY
Qty: 30 TABLET | Refills: 0 | Status: SHIPPED | OUTPATIENT
Start: 2024-03-01

## 2024-03-01 NOTE — TELEPHONE ENCOUNTER
Spoke with [facility] phar rep and they need pt insurance information and also does his insurance cover brand name.  Will have pt reach ou to them to provide needed information.  Satya Byrd, my prescription says that it was approved and then I immediately got a message that it was denied. Is it not working with [facility] either?  Thanks,  Gunnar    Message left on phone to see mychart msg

## 2024-04-17 DIAGNOSIS — F90.2 ATTENTION DEFICIT HYPERACTIVITY DISORDER (ADHD), COMBINED TYPE: ICD-10-CM

## 2024-04-18 RX ORDER — LISDEXAMFETAMINE DIMESYLATE CAPSULES 60 MG/1
60 CAPSULE ORAL EVERY MORNING
Qty: 30 CAPSULE | Refills: 0 | Status: SHIPPED | OUTPATIENT
Start: 2024-04-18

## 2024-04-18 RX ORDER — DEXTROAMPHETAMINE SACCHARATE, AMPHETAMINE ASPARTATE, DEXTROAMPHETAMINE SULFATE AND AMPHETAMINE SULFATE 5; 5; 5; 5 MG/1; MG/1; MG/1; MG/1
20 TABLET ORAL DAILY
Qty: 30 TABLET | Refills: 0 | Status: SHIPPED | OUTPATIENT
Start: 2024-04-18

## 2024-04-18 NOTE — TELEPHONE ENCOUNTER
Please review. Rx failed/no protocol.    Patient's dispense hx: 01/21/24, and 11/30/23.    Requested Prescriptions   Pending Prescriptions Disp Refills    amphetamine-dextroamphetamine (ADDERALL) 20 MG Oral Tab 30 tablet 0     Sig: Take 1 tablet (20 mg total) by mouth daily.       Controlled Substance Medication Failed - 4/17/2024 10:54 AM        Failed - This medication is a controlled substance - forward to provider to refill               Recent Outpatient Visits              10 months ago Annual physical exam    [facility] Duane Farmer MD    Office Visit    11 months ago [facility]    2 years ago Attention deficit hyperactivity disorder (ADHD), combined type    [facility]    3 years ago [facility]    4 years ago Attention deficit hyperactivity disorder (ADHD), combined type    [facility]    [facility]

## 2024-04-18 NOTE — TELEPHONE ENCOUNTER
Please review. Rx failed/no protocol.    No current dispense hx: last written 09/19/2023    Patient comment: Can we try to get this again? Last time they said they were on backorder for months     Requested Prescriptions   Pending Prescriptions Disp Refills    Lisdexamfetamine Dimesylate (VYVANSE) 60 MG Oral Cap 30 capsule 0     Sig: Take 1 capsule (60 mg total) by mouth every morning.       Controlled Substance Medication Failed - 4/17/2024 10:55 AM        Failed - This medication is a controlled substance - forward to provider to refill               Recent Outpatient Visits              10 months ago Annual physical exam    [facility] Duane Farmer MD    Office Visit    11 months ago [facility]    2 years ago Attention deficit hyperactivity disorder (ADHD), combined type    [facility]    3 years ago Rash    [facility]    4 years ago Attention deficit hyperactivity disorder (ADHD), combined type    [facility]    [facility]

## 2024-07-29 ENCOUNTER — TELEPHONE (OUTPATIENT)
Dept: INTERNAL MEDICINE CLINIC | Facility: CLINIC | Age: 34
End: 2024-07-29

## 2024-07-29 DIAGNOSIS — F90.2 ATTENTION DEFICIT HYPERACTIVITY DISORDER (ADHD), COMBINED TYPE: ICD-10-CM

## 2024-08-01 RX ORDER — LISDEXAMFETAMINE DIMESYLATE 60 MG/1
60 CAPSULE ORAL EVERY MORNING
Qty: 30 CAPSULE | Refills: 0 | Status: SHIPPED | OUTPATIENT
Start: 2024-08-01

## 2024-08-01 NOTE — TELEPHONE ENCOUNTER
Please review. Protocol Failed; No Protocol      Recent fills: 3/7/2024, 4/23/2024  Last Rx written: 4/18/2024  Last office visit: 6/6/2023      [employer] message sent to patient to schedule an office visit with Provider and/or  Routed to Patient  for assistance with appointment.         Requested Prescriptions   Pending Prescriptions Disp Refills    Lisdexamfetamine Dimesylate (VYVANSE) 60 MG Oral Cap 30 capsule 0     Sig: Take 1 capsule (60 mg total) by mouth every morning.       Controlled Substance Medication Failed - 7/29/2024  2:59 PM        Failed - This medication is a controlled substance - forward to provider to refill                 Recent Outpatient Visits              1 year ago Annual physical exam    [facility] uDane Farmer MD    Office Visit    1 year ago [facility]    2 years ago Attention deficit hyperactivity disorder (ADHD), combined type    [facility]    4 years [facility]    4 years ago Attention deficit hyperactivity disorder (ADHD), combined type    [facility]    [facility]

## 2024-09-12 DIAGNOSIS — F90.2 ATTENTION DEFICIT HYPERACTIVITY DISORDER (ADHD), COMBINED TYPE: ICD-10-CM

## 2024-09-16 NOTE — TELEPHONE ENCOUNTER
Please review.  Protocol failed / Has no protocol.         Lisdexamfetamine Dimesylate (VYVANSE) 60 MG      Patient Comment: I just moved to [place] for work and would like to refill 1 more time before finding a new doctor down here. Thanks!    Patient has not been seen in over a year:  Recent Visits  Date Type Provider Dept   06/06/23 Office Visit Duane Farmer MD [facility]       Requested Prescriptions   Pending Prescriptions Disp Refills    Lisdexamfetamine Dimesylate (VYVANSE) 60 MG Oral Cap 30 capsule 0     Sig: Take 1 capsule (60 mg total) by mouth every morning.       Controlled Substance Medication Failed - 9/12/2024  8:03 AM        Failed - This medication is a controlled substance - forward to provider to refill             Recent Outpatient Visits              1 year ago Annual physical exam    [facility] Duane Farmer MD    Office Visit    1 year ago Essential hypertension    [facility], Loretta Carter MD    Telemedicine    2 years ago Attention deficit hyperactivity disorder (ADHD), combined type    [facility]    4 years ago [facility]    4 years ago Attention deficit hyperactivity disorder (ADHD), combined type    [facility]    [facility]

## 2024-09-17 RX ORDER — LISDEXAMFETAMINE DIMESYLATE 60 MG/1
60 CAPSULE ORAL EVERY MORNING
Qty: 30 CAPSULE | Refills: 0 | OUTPATIENT
Start: 2024-09-17

## 2024-10-10 ENCOUNTER — TELEMEDICINE (OUTPATIENT)
Dept: INTERNAL MEDICINE CLINIC | Facility: CLINIC | Age: 34
End: 2024-10-10
Payer: COMMERCIAL

## 2024-10-10 DIAGNOSIS — F90.2 ATTENTION DEFICIT HYPERACTIVITY DISORDER (ADHD), COMBINED TYPE: ICD-10-CM

## 2024-10-10 PROCEDURE — 99214 OFFICE O/P EST MOD 30 MIN: CPT | Performed by: INTERNAL MEDICINE

## 2024-10-10 RX ORDER — LISDEXAMFETAMINE DIMESYLATE 60 MG/1
60 CAPSULE ORAL EVERY MORNING
Qty: 30 CAPSULE | Refills: 0 | Status: SHIPPED | OUTPATIENT
Start: 2024-10-10

## 2024-10-10 NOTE — PROGRESS NOTES
HPI:    Patient ID: Gunnar Wright is a 34 year old male.    HPI  Gunnar Wright verbally consents to a telemedicine service with live, interactive video and audio on 10/10/2024.  Patient understands and accepts financial responsibility for any deductible, co-insurance and/or co-pays associated with this service.   Patient is doing well, got a job in [place] , very happy with it , will be seeing a new physician there in Nov.   Needs a refill before then ,   He is on Vyvanse again and doing well on it .   No issues at work, no headaches, has not been checking his bp , will do so .   Has lost about 10 lbs.  Walking more.      Review of Systems   Constitutional: Negative.    HENT: Negative.     Eyes: Negative.    Respiratory: Negative.     Cardiovascular:  Negative for chest pain, palpitations and leg swelling.   Gastrointestinal: Negative.    Endocrine: Negative.    Genitourinary:  Negative for decreased urine volume, difficulty urinating, dysuria, flank pain, frequency, hematuria and urgency.   Musculoskeletal:  Negative for arthralgias, back pain, gait problem, joint swelling and myalgias.   Skin:  Negative for color change, rash and wound.   Allergic/Immunologic: Negative.    Neurological: Negative.  Negative for headaches.   Psychiatric/Behavioral:  Negative for agitation, confusion and sleep disturbance. The patient is not nervous/anxious.             Current Outpatient Medications   Medication Sig Dispense Refill    Lisdexamfetamine Dimesylate (VYVANSE) 60 MG Oral Cap Take 1 capsule (60 mg total) by mouth every morning. 30 capsule 0    amphetamine-dextroamphetamine (ADDERALL) 20 MG Oral Tab Take 1 tablet (20 mg total) by mouth daily. 30 tablet 0    amLODIPine 5 MG Oral Tab Take 1 tablet (5 mg total) by mouth daily. 90 tablet 3    Multiple Vitamins-Minerals (MULTI-VITAMIN/MINERALS) Oral Tab Take 1 tablet by mouth daily.      Biotin 5 MG Oral Tab Take by mouth.       Allergies:Allergies[1]   PHYSICAL EXAM:    There were no vitals taken for this visit.     Physical Exam  Constitutional:       Appearance: Normal appearance.   Pulmonary:      Effort: Pulmonary effort is normal.   Neurological:      Mental Status: He is oriented to person, place, and time.   Psychiatric:         Mood and Affect: Mood normal.         Behavior: Behavior normal.         Thought Content: Thought content normal.         Judgment: Judgment normal.                ASSESSMENT/PLAN:   Attention deficit hyperactivity disorder (ADHD), combined type  Refill of medication .   Patient is doing well on current Vyvanse   He will establish with a doc in [place]. He is moving there , has an appointment in Nov.  He will aware that he will need a physical with labs at that time.   He will check bp  at home and forward reading.     No orders of the defined types were placed in this encounter.      Meds This Visit:  Requested Prescriptions     Signed Prescriptions Disp Refills    Lisdexamfetamine Dimesylate (VYVANSE) 60 MG Oral Cap 30 capsule 0     Sig: Take 1 capsule (60 mg total) by mouth every morning.       Imaging & Referrals:  None       ID#[id number]       [1] No Known Allergies

## 2024-10-10 NOTE — ASSESSMENT & PLAN NOTE
Refill of medication .   Patient is doing well on current Vyvanse   He will establish with a doc in [place]. He is moving there , has an appointment in Nov.  He will aware that he will need a physical with labs at that time.   He will check bp  at home and forward reading.

## 2024-10-11 DIAGNOSIS — F90.2 ATTENTION DEFICIT HYPERACTIVITY DISORDER (ADHD), COMBINED TYPE: ICD-10-CM

## 2024-10-15 RX ORDER — DEXTROAMPHETAMINE SACCHARATE, AMPHETAMINE ASPARTATE, DEXTROAMPHETAMINE SULFATE AND AMPHETAMINE SULFATE 5; 5; 5; 5 MG/1; MG/1; MG/1; MG/1
20 TABLET ORAL DAILY
Qty: 30 TABLET | Refills: 0 | OUTPATIENT
Start: 2024-10-15

## 2024-10-15 RX ORDER — LISDEXAMFETAMINE DIMESYLATE 60 MG/1
60 CAPSULE ORAL EVERY MORNING
Qty: 30 CAPSULE | Refills: 0 | OUTPATIENT
Start: 2024-10-15

## 2024-10-15 NOTE — TELEPHONE ENCOUNTER
Please disregard: patient is only on Vyvanse per last office visit.     Requested Prescriptions   Pending Prescriptions Disp Refills    amphetamine-dextroamphetamine (ADDERALL) 20 MG Oral Tab 30 tablet 0     Sig: Take 1 tablet (20 mg total) by mouth daily.       Controlled Substance Medication Failed - 10/15/2024  8:44 AM        Failed - This medication is a controlled substance - forward to provider to refill         Refused Prescriptions Disp Refills    Lisdexamfetamine Dimesylate (VYVANSE) 60 MG Oral Cap 30 capsule 0     Sig: Take 1 capsule (60 mg total) by mouth every morning.       Controlled Substance Medication Failed - 10/15/2024  8:44 AM        Failed - This medication is a controlled substance - forward to provider to refill             Recent Outpatient Visits              5 days ago Attention deficit hyperactivity disorder (ADHD), combined type    [facility] Duane Farmer MD    Telemedicine    1 year ago Annual physical exam    [facility] Duane Farmer MD    Office Visit    1 year ago [facility]    3 years ago Attention deficit hyperactivity disorder (ADHD), combined type    [facility]    4 years ago Rash    [facility]M

## (undated) DIAGNOSIS — F90.2 ATTENTION DEFICIT HYPERACTIVITY DISORDER (ADHD), COMBINED TYPE: ICD-10-CM

## (undated) DIAGNOSIS — I10 ESSENTIAL HYPERTENSION: ICD-10-CM

## (undated) NOTE — LETTER
08/21/19        [address] [place] [address]      Dear Kaelyn Yates,    Our records indicate that you have outstanding lab work and or testing that was ordered for you and has not yet been completed:  Orders Placed This Encounter

## (undated) NOTE — LETTER
09/21/21    Gunnar Lui [address] [address]      Dear Sia Bonilla,    Your request has been denied because you're overdue to be seen. You will need an appointment for further refills.  Please schedule as soon as poss